# Patient Record
Sex: FEMALE | Race: WHITE | NOT HISPANIC OR LATINO | Employment: OTHER | URBAN - NONMETROPOLITAN AREA
[De-identification: names, ages, dates, MRNs, and addresses within clinical notes are randomized per-mention and may not be internally consistent; named-entity substitution may affect disease eponyms.]

---

## 2021-03-27 ENCOUNTER — HOSPITAL ENCOUNTER (EMERGENCY)
Facility: HOSPITAL | Age: 71
Discharge: HOME | End: 2021-03-27
Attending: EMERGENCY MEDICINE
Payer: COMMERCIAL

## 2021-03-27 ENCOUNTER — APPOINTMENT (EMERGENCY)
Dept: RADIOLOGY | Facility: HOSPITAL | Age: 71
End: 2021-03-27
Payer: COMMERCIAL

## 2021-03-27 ENCOUNTER — APPOINTMENT (EMERGENCY)
Dept: CT IMAGING | Facility: HOSPITAL | Age: 71
End: 2021-03-27
Payer: COMMERCIAL

## 2021-03-27 VITALS
HEART RATE: 83 BPM | TEMPERATURE: 97.4 F | RESPIRATION RATE: 18 BRPM | SYSTOLIC BLOOD PRESSURE: 178 MMHG | HEIGHT: 68 IN | DIASTOLIC BLOOD PRESSURE: 81 MMHG | OXYGEN SATURATION: 98 %

## 2021-03-27 DIAGNOSIS — M25.551 RIGHT HIP PAIN: Primary | ICD-10-CM

## 2021-03-27 LAB
ALBUMIN SERPL BCP-MCNC: 3.7 G/DL (ref 3.5–5)
ALP SERPL-CCNC: 87 U/L (ref 46–116)
ALT SERPL W P-5'-P-CCNC: 21 U/L (ref 12–78)
ANION GAP SERPL CALCULATED.3IONS-SCNC: 8 MMOL/L (ref 4–13)
AST SERPL W P-5'-P-CCNC: 14 U/L (ref 5–45)
BACTERIA UR QL AUTO: NORMAL /HPF
BASOPHILS # BLD AUTO: 0.05 THOUSANDS/ΜL (ref 0–0.1)
BASOPHILS NFR BLD AUTO: 1 % (ref 0–1)
BILIRUB SERPL-MCNC: 0.27 MG/DL (ref 0.2–1)
BILIRUB UR QL STRIP: NEGATIVE
BUN SERPL-MCNC: 14 MG/DL (ref 5–25)
CALCIUM SERPL-MCNC: 9 MG/DL (ref 8.3–10.1)
CHLORIDE SERPL-SCNC: 106 MMOL/L (ref 100–108)
CLARITY UR: CLEAR
CO2 SERPL-SCNC: 27 MMOL/L (ref 21–32)
COLOR UR: ABNORMAL
CREAT SERPL-MCNC: 1.08 MG/DL (ref 0.6–1.3)
EOSINOPHIL # BLD AUTO: 0.05 THOUSAND/ΜL (ref 0–0.61)
EOSINOPHIL NFR BLD AUTO: 1 % (ref 0–6)
ERYTHROCYTE [DISTWIDTH] IN BLOOD BY AUTOMATED COUNT: 11.7 % (ref 11.6–15.1)
GFR SERPL CREATININE-BSD FRML MDRD: 52 ML/MIN/1.73SQ M
GLUCOSE SERPL-MCNC: 112 MG/DL (ref 65–140)
GLUCOSE UR STRIP-MCNC: NEGATIVE MG/DL
HCT VFR BLD AUTO: 40.3 % (ref 34.8–46.1)
HGB BLD-MCNC: 13.1 G/DL (ref 11.5–15.4)
HGB UR QL STRIP.AUTO: ABNORMAL
IMM GRANULOCYTES # BLD AUTO: 0.03 THOUSAND/UL (ref 0–0.2)
IMM GRANULOCYTES NFR BLD AUTO: 0 % (ref 0–2)
KETONES UR STRIP-MCNC: NEGATIVE MG/DL
LEUKOCYTE ESTERASE UR QL STRIP: ABNORMAL
LIPASE SERPL-CCNC: 156 U/L (ref 73–393)
LYMPHOCYTES # BLD AUTO: 3.26 THOUSANDS/ΜL (ref 0.6–4.47)
LYMPHOCYTES NFR BLD AUTO: 33 % (ref 14–44)
MCH RBC QN AUTO: 30.1 PG (ref 26.8–34.3)
MCHC RBC AUTO-ENTMCNC: 32.5 G/DL (ref 31.4–37.4)
MCV RBC AUTO: 93 FL (ref 82–98)
MONOCYTES # BLD AUTO: 0.73 THOUSAND/ΜL (ref 0.17–1.22)
MONOCYTES NFR BLD AUTO: 8 % (ref 4–12)
NEUTROPHILS # BLD AUTO: 5.63 THOUSANDS/ΜL (ref 1.85–7.62)
NEUTS SEG NFR BLD AUTO: 57 % (ref 43–75)
NITRITE UR QL STRIP: NEGATIVE
NON-SQ EPI CELLS URNS QL MICRO: NORMAL /HPF
NRBC BLD AUTO-RTO: 0 /100 WBCS
PH UR STRIP.AUTO: 6 [PH]
PLATELET # BLD AUTO: 321 THOUSANDS/UL (ref 149–390)
PMV BLD AUTO: 10.2 FL (ref 8.9–12.7)
POTASSIUM SERPL-SCNC: 3.9 MMOL/L (ref 3.5–5.3)
PROT SERPL-MCNC: 7.6 G/DL (ref 6.4–8.2)
PROT UR STRIP-MCNC: NEGATIVE MG/DL
RBC # BLD AUTO: 4.35 MILLION/UL (ref 3.81–5.12)
RBC #/AREA URNS AUTO: NORMAL /HPF
SODIUM SERPL-SCNC: 141 MMOL/L (ref 136–145)
SP GR UR STRIP.AUTO: <=1.005 (ref 1–1.03)
UROBILINOGEN UR QL STRIP.AUTO: 0.2 E.U./DL
WBC # BLD AUTO: 9.75 THOUSAND/UL (ref 4.31–10.16)
WBC #/AREA URNS AUTO: NORMAL /HPF

## 2021-03-27 PROCEDURE — 96374 THER/PROPH/DIAG INJ IV PUSH: CPT

## 2021-03-27 PROCEDURE — 96375 TX/PRO/DX INJ NEW DRUG ADDON: CPT

## 2021-03-27 PROCEDURE — 85025 COMPLETE CBC W/AUTO DIFF WBC: CPT | Performed by: EMERGENCY MEDICINE

## 2021-03-27 PROCEDURE — 96361 HYDRATE IV INFUSION ADD-ON: CPT

## 2021-03-27 PROCEDURE — 81001 URINALYSIS AUTO W/SCOPE: CPT | Performed by: EMERGENCY MEDICINE

## 2021-03-27 PROCEDURE — 80053 COMPREHEN METABOLIC PANEL: CPT | Performed by: EMERGENCY MEDICINE

## 2021-03-27 PROCEDURE — 99285 EMERGENCY DEPT VISIT HI MDM: CPT | Performed by: EMERGENCY MEDICINE

## 2021-03-27 PROCEDURE — 99284 EMERGENCY DEPT VISIT MOD MDM: CPT

## 2021-03-27 PROCEDURE — 36415 COLL VENOUS BLD VENIPUNCTURE: CPT | Performed by: EMERGENCY MEDICINE

## 2021-03-27 PROCEDURE — 73502 X-RAY EXAM HIP UNI 2-3 VIEWS: CPT

## 2021-03-27 PROCEDURE — 83690 ASSAY OF LIPASE: CPT | Performed by: EMERGENCY MEDICINE

## 2021-03-27 PROCEDURE — 74176 CT ABD & PELVIS W/O CONTRAST: CPT

## 2021-03-27 PROCEDURE — G1004 CDSM NDSC: HCPCS

## 2021-03-27 RX ORDER — HYDROCODONE BITARTRATE AND ACETAMINOPHEN 5; 325 MG/1; MG/1
1 TABLET ORAL ONCE
Status: COMPLETED | OUTPATIENT
Start: 2021-03-27 | End: 2021-03-27

## 2021-03-27 RX ORDER — MECLIZINE HYDROCHLORIDE 25 MG/1
25 TABLET ORAL
COMMUNITY
Start: 2021-03-02

## 2021-03-27 RX ORDER — METOPROLOL SUCCINATE 25 MG/1
25 TABLET, EXTENDED RELEASE ORAL
COMMUNITY
Start: 2021-03-02

## 2021-03-27 RX ORDER — MONTELUKAST SODIUM 10 MG/1
10 TABLET ORAL
COMMUNITY
Start: 2021-03-02

## 2021-03-27 RX ORDER — RALOXIFENE HYDROCHLORIDE 60 MG/1
60 TABLET, FILM COATED ORAL
COMMUNITY
Start: 2021-03-02

## 2021-03-27 RX ORDER — HYDROCODONE BITARTRATE AND ACETAMINOPHEN 5; 325 MG/1; MG/1
1 TABLET ORAL EVERY 6 HOURS PRN
Qty: 10 TABLET | Refills: 0 | Status: SHIPPED | OUTPATIENT
Start: 2021-03-27 | End: 2021-04-06

## 2021-03-27 RX ORDER — MORPHINE SULFATE 4 MG/ML
4 INJECTION, SOLUTION INTRAMUSCULAR; INTRAVENOUS ONCE
Status: COMPLETED | OUTPATIENT
Start: 2021-03-27 | End: 2021-03-27

## 2021-03-27 RX ORDER — ONDANSETRON 2 MG/ML
4 INJECTION INTRAMUSCULAR; INTRAVENOUS ONCE
Status: COMPLETED | OUTPATIENT
Start: 2021-03-27 | End: 2021-03-27

## 2021-03-27 RX ORDER — NYSTATIN 100000 U/G
1 OINTMENT TOPICAL
COMMUNITY
Start: 2020-05-08 | End: 2021-05-08

## 2021-03-27 RX ORDER — FLUTICASONE PROPIONATE 50 MCG
1 SPRAY, SUSPENSION (ML) NASAL DAILY
COMMUNITY

## 2021-03-27 RX ADMIN — HYDROCODONE BITARTRATE AND ACETAMINOPHEN 1 TABLET: 5; 325 TABLET ORAL at 21:05

## 2021-03-27 RX ADMIN — MORPHINE SULFATE 4 MG: 4 INJECTION INTRAVENOUS at 18:52

## 2021-03-27 RX ADMIN — SODIUM CHLORIDE 1000 ML: 0.9 INJECTION, SOLUTION INTRAVENOUS at 18:50

## 2021-03-27 RX ADMIN — ONDANSETRON 4 MG: 2 INJECTION INTRAMUSCULAR; INTRAVENOUS at 18:52

## 2021-03-27 NOTE — ED PROVIDER NOTES
History  Chief Complaint   Patient presents with    Hip Pain     right hip pain of unknown origin     Patient is a 9year-old female presenting to emergency department complaining of crampy abdominal pain consistent with diverticulitis which she in the past she is now having pain from the right buttock shooting into the, worsened with certain movements and weight-bearing, denies any fall or, no nausea, vomiting or diarrhea, no dysuria or hematuria, no fever or chills          Prior to Admission Medications   Prescriptions Last Dose Informant Patient Reported? Taking? Calcium Carb-Cholecalciferol 600-800 MG-UNIT CHEW   Yes No   Sig: Chew 1 tablet   fluticasone (FLONASE) 50 mcg/act nasal spray   Yes Yes   Si spray into each nostril daily   meclizine (ANTIVERT) 25 mg tablet   Yes Yes   Sig: Take 25 mg by mouth   metoprolol succinate (TOPROL-XL) 25 mg 24 hr tablet   Yes Yes   Sig: Take 25 mg by mouth   montelukast (SINGULAIR) 10 mg tablet   Yes Yes   Sig: Take 10 mg by mouth   nystatin (MYCOSTATIN) ointment   Yes Yes   Sig: Apply 1 application topically   raloxifene (EVISTA) 60 mg tablet   Yes Yes   Sig: Take 60 mg by mouth      Facility-Administered Medications: None       Past Medical History:   Diagnosis Date    Arthritis     Hypertension     Renal disorder        History reviewed  No pertinent surgical history  History reviewed  No pertinent family history  I have reviewed and agree with the history as documented  E-Cigarette/Vaping    E-Cigarette Use Never User      E-Cigarette/Vaping Substances     Social History     Tobacco Use    Smoking status: Never Smoker    Smokeless tobacco: Never Used   Substance Use Topics    Alcohol use: Not Currently    Drug use: Not Currently       Review of Systems   Constitutional: Negative  HENT: Negative  Eyes: Negative  Respiratory: Negative  Cardiovascular: Negative  Gastrointestinal: Positive for abdominal pain  Endocrine: Negative  Genitourinary: Negative  Musculoskeletal: Positive for arthralgias (Right sacroiliac/hip)  Skin: Negative  Allergic/Immunologic: Negative  Neurological: Negative  Hematological: Negative  Psychiatric/Behavioral: Negative  Physical Exam  Physical Exam  Constitutional:       Appearance: She is well-developed  HENT:      Head: Normocephalic and atraumatic  Eyes:      Conjunctiva/sclera: Conjunctivae normal       Pupils: Pupils are equal, round, and reactive to light  Neck:      Musculoskeletal: Normal range of motion and neck supple  Cardiovascular:      Rate and Rhythm: Normal rate  Pulmonary:      Effort: Pulmonary effort is normal    Abdominal:      Palpations: Abdomen is soft  Tenderness: There is abdominal tenderness in the suprapubic area  Musculoskeletal: Normal range of motion  Back:       Comments: Tenderness to palpate over left PSIS, pain range of motion testing of the right   Skin:     General: Skin is warm and dry  Neurological:      Mental Status: She is alert and oriented to person, place, and time           Vital Signs  ED Triage Vitals   Temperature Pulse Respirations Blood Pressure SpO2   03/27/21 1822 03/27/21 1822 03/27/21 1822 03/27/21 1837 03/27/21 1822   98 5 °F (36 9 °C) 94 18 (!) 227/91 99 %      Temp Source Heart Rate Source Patient Position - Orthostatic VS BP Location FiO2 (%)   03/27/21 1822 03/27/21 1822 03/27/21 1822 03/27/21 1822 --   Oral Monitor Sitting Left arm       Pain Score       03/27/21 1822       6           Vitals:    03/27/21 1822 03/27/21 1837 03/27/21 2037   BP:  (!) 227/91 (!) 199/80   Pulse: 94  74   Patient Position - Orthostatic VS: Sitting  Sitting               ED Medications  Medications   HYDROcodone-acetaminophen (NORCO) 5-325 mg per tablet 1 tablet (has no administration in time range)   morphine (PF) 4 mg/mL injection 4 mg (4 mg Intravenous Given 3/27/21 1852)   sodium chloride 0 9 % bolus 1,000 mL (1,000 mL Intravenous New Bag 3/27/21 1850)   ondansetron (ZOFRAN) injection 4 mg (4 mg Intravenous Given 3/27/21 1852)       Diagnostic Studies  Results Reviewed     Procedure Component Value Units Date/Time    CMP [[id number]] Collected: 03/27/21 1850    Lab Status: Final result Specimen: Blood from Arm, Right Updated: 03/27/21 1911     Sodium 141 mmol/L      Potassium 3 9 mmol/L      Chloride 106 mmol/L      CO2 27 mmol/L      ANION GAP 8 mmol/L      BUN 14 mg/dL      Creatinine 1 08 mg/dL      Glucose 112 mg/dL      Calcium 9 0 mg/dL      AST 14 U/L      ALT 21 U/L      Alkaline Phosphatase 87 U/L      Total Protein 7 6 g/dL      Albumin 3 7 g/dL      Total Bilirubin 0 27 mg/dL      eGFR 52 ml/min/1 73sq m     Narrative:      National Kidney Disease Foundation guidelines for Chronic Kidney Disease (CKD):     Stage 1 with normal or high GFR (GFR > 90 mL/min/1 73 square meters)    Stage 2 Mild CKD (GFR = 60-89 mL/min/1 73 square meters)    Stage 3A Moderate CKD (GFR = 45-59 mL/min/1 73 square meters)    Stage 3B Moderate CKD (GFR = 30-44 mL/min/1 73 square meters)    Stage 4 Severe CKD (GFR = 15-29 mL/min/1 73 square meters)    Stage 5 End Stage CKD (GFR <15 mL/min/1 73 square meters)  Note: GFR calculation is accurate only with a steady state creatinine    Lipase [[id number]]  (Normal) Collected: 03/27/21 1850    Lab Status: Final result Specimen: Blood from Arm, Right Updated: 03/27/21 1911     Lipase 156 u/L     CBC and differential [[id number]] Collected: 03/27/21 1850    Lab Status: Final result Specimen: Blood from Arm, Right Updated: 03/27/21 1854     WBC 9 75 Thousand/uL      RBC 4 35 Million/uL      Hemoglobin 13 1 g/dL      Hematocrit 40 3 %      MCV 93 fL      MCH 30 1 pg      MCHC 32 5 g/dL      RDW 11 7 %      MPV 10 2 fL      Platelets 055 Thousands/uL      nRBC 0 /100 WBCs      Neutrophils Relative 57 %      Immat GRANS % 0 %      Lymphocytes Relative 33 %      Monocytes Relative 8 %      Eosinophils Relative 1 %      Basophils Relative 1 %      Neutrophils Absolute 5 63 Thousands/µL      Immature Grans Absolute 0 03 Thousand/uL      Lymphocytes Absolute 3 26 Thousands/µL      Monocytes Absolute 0 73 Thousand/µL      Eosinophils Absolute 0 05 Thousand/µL      Basophils Absolute 0 05 Thousands/µL     Urine Microscopic [[id number]]  (Normal) Collected: 03/27/21 1830    Lab Status: Final result Specimen: Urine, Clean Catch Updated: 03/27/21 1846     RBC, UA 0-1 /hpf      WBC, UA 2-4 /hpf      Epithelial Cells Occasional /hpf      Bacteria, UA None Seen /hpf     UA w Reflex to Microscopic w Reflex to Culture [[id number]]  (Abnormal) Collected: 03/27/21 1830    Lab Status: Final result Specimen: Urine, Clean Catch Updated: 03/27/21 1836     Color, UA Straw     Clarity, UA Clear     Specific Gravity, UA <=1 005     pH, UA 6 0     Leukocytes, UA Trace     Nitrite, UA Negative     Protein, UA Negative mg/dl      Glucose, UA Negative mg/dl      Ketones, UA Negative mg/dl      Urobilinogen, UA 0 2 E U /dl      Bilirubin, UA Negative     Blood, UA Trace-lysed                 CT renal stone study abdomen pelvis without contrast   Final Result by Parisa Trevino MD (03/27 2051)      No urinary tract calculi  Colonic diverticulosis without evidence of diverticulitis  Workstation performed: [id number]         XR hip/pelv 2-3 vws right   ED Interpretation by Junaid Silveira DO (03/27 1858)   No acute findings      Final Result by Parisa Trevino MD (03/27 2052)      No acute osseous abnormality              Workstation performed: [id number]                          ED Course  ED Course as of Mar 27 2058   Sat Mar 27, 2021   2057 Lab and imaging findings discussed at bedside which are unremarkable, findings consistent with musculoskeletal pain, patient given prescription for small amount of opiate pain medication, advised to follow-up with PCP and orthopedics as needed, return if symptoms worsen, patient acknowledges understanding and agreement with this plan                                                Disposition  Final diagnoses:   Right hip pain     Time reflects when diagnosis was documented in both MDM as applicable and the Disposition within this note     Time User Action Codes Description Comment    3/27/2021  8:56 PM Arlene Kumar [M25 551] Right hip pain       ED Disposition     ED Disposition Condition Date/Time Comment    Discharge Stable Sat Mar 27, 2021  8:56 PM Camilo Bowman discharge to home/self care  Follow-up Information     Follow up With Specialties Details Why [address] Walls IV, MD Family Medicine  As needed Austin Freeman [address]  [address] Orthopedic Surgery  As needed [address]  [address] [facility],[address] [facility]            Patient's Medications   Discharge Prescriptions    HYDROCODONE-ACETAMINOPHEN (NORCO) 5-325 MG PER TABLET    Take 1 tablet by mouth every 6 (six) hours as needed for pain for up to 10 daysMax Daily Amount: 4 tablets       Start Date: 3/27/2021 End Date: 4/6/2021       Order Dose: 1 tablet       Quantity: 10 tablet    Refills: 0     No discharge procedures on file      PDMP Review     None          ED Provider  Electronically Signed by           Johana Monroy DO  03/27/21 7999

## 2021-03-31 ENCOUNTER — OFFICE VISIT (OUTPATIENT)
Dept: OBGYN CLINIC | Facility: CLINIC | Age: 71
End: 2021-03-31
Payer: COMMERCIAL

## 2021-03-31 VITALS
DIASTOLIC BLOOD PRESSURE: 80 MMHG | WEIGHT: 146 LBS | HEART RATE: 71 BPM | SYSTOLIC BLOOD PRESSURE: 160 MMHG | HEIGHT: 68 IN | TEMPERATURE: 98.4 F | BODY MASS INDEX: 22.13 KG/M2

## 2021-03-31 DIAGNOSIS — M46.1 SACROILIITIS (HCC): ICD-10-CM

## 2021-03-31 DIAGNOSIS — M54.50 CHRONIC RIGHT-SIDED LOW BACK PAIN WITHOUT SCIATICA: ICD-10-CM

## 2021-03-31 DIAGNOSIS — M25.551 RIGHT HIP PAIN: Primary | ICD-10-CM

## 2021-03-31 DIAGNOSIS — G89.29 CHRONIC RIGHT-SIDED LOW BACK PAIN WITHOUT SCIATICA: ICD-10-CM

## 2021-03-31 PROCEDURE — 99203 OFFICE O/P NEW LOW 30 MIN: CPT | Performed by: ORTHOPAEDIC SURGERY

## 2021-03-31 NOTE — PROGRESS NOTES
ASSESSMENT/PLAN:     Diagnoses and all orders for this visit:    Right hip pain  -     Ambulatory referral to Physical Therapy; Future    Chronic right-sided low back pain without sciatica    Sacroiliitis ([address] )  -     Ambulatory referral to Physical Therapy; Future         x-rays performed in the ED reviewed with the patient  She was provided a referral to physical therapy  She should take tylenol as needed for pain control  We will transition care to Dr Aubrey Barrera for discussion of SI joint injections if she continues to experience pain  She was encouraged to follow up in 4 weeks time  She was encouraged to contact us should questions or concerns arise  Return in about 4 weeks (around 4/28/2021)  History of Present Illness: The patient is a 79 y o  female presenting today  For evaluation of left hip pain  The patient reports approximately 10 days ago she was playing with her grandchild dog when she began experiencing right groin pain  She was able to do her normal activities for the next 2 days bit began experiencing increasing right grade wearing pain and right lower back pain  She reports her pain got so severe she could hardly sit up or lie down without help of her   She also needed the support of a walker due to the groin pain  She presented to the ED for evaluation as she thought she may been experiencing diverticulitis CT scan of the abdomen and x-rays of the right hip were obtained and more negative for any acute issues  She was sent home but encouraged to follow up with Orthopedics  Since then, she has been experiencing hip and lower back pain that is worsened with ambulating and going from a seated to a lying down position  She reports she struggles the most at night when trying to get into bed  She also requires the assistance of her  to set up properly in the morning  She has been using the walker at for assistance    She denies numbness or tingling in her right lower extremity  She denies radiation of her pain  She does report a prior injury in the right lower back approximately 1 year ago when she fell causing a contusion on her lower back  Past Medical, Social and Family History:  Past Medical History:   Diagnosis Date    Arthritis     Hypertension     Renal disorder      Past Surgical History:   Procedure Laterality Date    APPENDECTOMY       Allergies   Allergen Reactions    Advil [Ibuprofen] Other (See Comments)     Pt has kidney issues    Cetirizine Other (See Comments)    Lactose - Food Allergy Other (See Comments)    Levofloxacin Dizziness and Myalgia     LEVA-DIANNE    Penicillins Hives    Tetracyclines & Related Other (See Comments)     Current Outpatient Medications on File Prior to Visit   Medication Sig Dispense Refill    Calcium Carb-Cholecalciferol 600-800 MG-UNIT CHEW Chew 1 tablet      fluticasone (FLONASE) 50 mcg/act nasal spray 1 spray into each nostril daily      meclizine (ANTIVERT) 25 mg tablet Take 25 mg by mouth      metoprolol succinate (TOPROL-XL) 25 mg 24 hr tablet Take 25 mg by mouth      montelukast (SINGULAIR) 10 mg tablet Take 10 mg by mouth      nystatin (MYCOSTATIN) ointment Apply 1 application topically      raloxifene (EVISTA) 60 mg tablet Take 60 mg by mouth      HYDROcodone-acetaminophen (NORCO) 5-325 mg per tablet Take 1 tablet by mouth every 6 (six) hours as needed for pain for up to 10 daysMax Daily Amount: 4 tablets (Patient not taking: Reported on 3/31/2021) 10 tablet 0     No current facility-administered medications on file prior to visit        Social History     Socioeconomic History    Marital status: /Civil Union     Spouse name: Not on file    Number of children: Not on file    Years of education: Not on file    Highest education level: Not on file   Occupational History    Not on file   Social Needs    Financial resource strain: Not on file    Food insecurity     Worry: Not on file Inability: Not on file    Transportation needs     Medical: Not on file     Non-medical: Not on file   Tobacco Use    Smoking status: Never Smoker    Smokeless tobacco: Never Used   Substance and Sexual Activity    Alcohol use: Not Currently    Drug use: Not Currently    Sexual activity: Not Currently   Lifestyle    Physical activity     Days per week: Not on file     Minutes per session: Not on file    Stress: Not on file   Relationships    Social connections     Talks on phone: Not on file     Gets together: Not on file     Attends Restoration service: Not on file     Active member of club or organization: Not on file     Attends meetings of clubs or organizations: Not on file     Relationship status: Not on file    Intimate partner violence     Fear of current or ex partner: Not on file     Emotionally abused: Not on file     Physically abused: Not on file     Forced sexual activity: Not on file   Other Topics Concern    Not on file   Social History Narrative    Not on file     Family History   Problem Relation Age of Onset    Osteoporosis Mother     No Known Problems Father        I have reviewed the past medical, surgical, social and family history, medications and allergies as documented in the EMR  Review of systems:  Constitutional: Negative for fatigue, fever or loss of apetite  HENT: Negative  Respiratory: Negative for shortness of breath, dyspnea  Cardiovascular: Negative for chest pain/tightness  Gastrointestinal: Negative for abdominal pain, N/V  Endocrine: Negative for cold/heat intolerance, unexplained weight loss/gain  Genitourinary: Negative for flank pain, dysuria, hematuria  Musculoskeletal:   Positive as stated in the HPI  Skin: Negative for rash  Neurological:   Negative for numbness and tingling  Psychiatric/Behavioral: Negative for agitation       Physical Exam:    Blood pressure 160/80, pulse 71, temperature 98 4 °F (36 9 °C), temperature source Temporal, height 5' 8" (1 727 m), weight 66 2 kg (146 lb)  General/Constitutional: NAD, well developed, well nourished  HENT: Normocephalic, atraumatic  CV: Intact distal pulses, regular rate  Resp: No respiratory distress or labored breathing  Lymphatic: No lymphadenopathy palpated  Neuro: Alert and Oriented x 3, no focal deficits  Psych: Normal mood, normal affect, normal judgement, normal behavior  Skin: Warm, dry, no rashes, no erythema        Hip Exam (focused):    right hip:     No dislocation/deformity  ROM: Full  Greater troch:non-tender   SI joint: tender  Jomar test: positive  For groin pain  Carlos's test:  negative  FADIR:  negative  Abduction: 5/5  Back:    No TTP over lumbar spinous processes, paraspinal musculature  Negative SLR/Sitting Root     No calf tenderness to palpation bilaterally    LE NV Exam: +2 DP/PT pulses bilaterally  Sensation intact to light touch L2-S1 bilaterally      IMAGING:  I have personally reviewed pertinent films in PACS  x-rays of the right hip performed in the ED on 03/27/2021 demonstrate no acute osseous abnormalities  No significant degenerative changes      PROCEDURES:   Procedures   none      Leo De Luna

## 2021-04-05 ENCOUNTER — EVALUATION (OUTPATIENT)
Dept: PHYSICAL THERAPY | Facility: CLINIC | Age: 71
End: 2021-04-05
Payer: COMMERCIAL

## 2021-04-05 DIAGNOSIS — M46.1 SACROILIITIS (HCC): ICD-10-CM

## 2021-04-05 DIAGNOSIS — M25.551 RIGHT HIP PAIN: ICD-10-CM

## 2021-04-05 PROCEDURE — 97110 THERAPEUTIC EXERCISES: CPT | Performed by: PHYSICAL THERAPIST

## 2021-04-05 PROCEDURE — 97161 PT EVAL LOW COMPLEX 20 MIN: CPT | Performed by: PHYSICAL THERAPIST

## 2021-04-05 NOTE — PROGRESS NOTES
PT Evaluation     Today's date: 2021  Patient name: Jennifer Cramer  :   MRN: [id number]  Referring provider: Jessica Ford  Dx:   Encounter Diagnosis     ICD-10-CM    1  Right hip pain  M25 551 Ambulatory referral to Physical Therapy   2  [address]roili[address] ([facility] [address] )  M46 1 Ambulatory referral to Physical Therapy                  Assessment  Assessment details: Jennifer Cramer is a 79 y o  female presenting to outpatient physical therapy on 2021 with referral from Dr Marisol Marrufo for R hip pain/sacroiliitis  Upon evaluation, Jaquan May demonstrates impaired strength, gait, and decreased flexibility in LE  The listed impairments and functional limitation are effecting Jaquan May ability to function at prior level  Jaquan May would benefit from physical therapy services at this time in order to address the above discussed impairments and functional limitation in order to allow for a return to premorbid status  Thank you for the referral        Impairments: abnormal gait, abnormal or restricted ROM, activity intolerance, impaired physical strength, lacks appropriate home exercise program and pain with function    Symptom irritability: moderateUnderstanding of Dx/Px/POC: excellent  Goals  STG  1) R hip ROM will improve to [facility] in 3 weeks  2) R hip strength will improve 1/2 grade in 3 weeks  3) Pt will decrease pain levels by 2-3 at its worst in 3 weeks  LTG  1) Patient is independent in HEP  2) Pt will be able to get in and out of bed without difficulty by DC  3) Ambulation will be improved to PLOF in 6 weeks  4) Pt will be able to ambulate without an assistive device by DC  5) Pt will abolish hip/ groin by DC         Plan  Patient would benefit from: skilled physical therapy  Planned modality interventions: cryotherapy and hydrotherapy  Planned therapy interventions: abdominal trunk stabilization, manual therapy, joint mobilization, massage, balance, body mechanics training, breathing training, patient education, neuromuscular re-education, strengthening, therapeutic activities, stretching, graded exercise, home exercise program, flexibility, functional ROM exercises and gait training  Frequency: 2x week  Duration in weeks: 4  Treatment plan discussed with: patient        Subjective Evaluation    History of Present Illness  Mechanism of injury: Pt notes that the weekend of March 21st she was watching her grand-dog  She notes that she was chasing it around the house and she felt something pull in her groin, but didn't think anything of it  She states that as the days went on she started to get more pain and spasms in her posterior hip  Currently, she notes that is still getting spasms on a daily basis, but they have decreased  She is able to sleep at night, but is having trouble moving in bed without pain  She has been using heat, which has been helping  She was using a walker in the beg , but now is only using a SPC out in the community  She is still having some difficulty getting in and out of a chair, but is getting better  She notes that after walking ~ 10 minutes she starts to get some discomfort  She has been able to do stairs, but is moving slower than she was previously  She states that she is getting some tingling into her R leg, but has not gotten any worse    Quality of life: excellent    Pain  Current pain rating: 3  At best pain rating: 3  At worst pain rating: 10  Location: posteroir R hip and into her groin  Quality: dull ache and knife-like  Relieving factors: heat  Aggravating factors: walking, stair climbing and sitting    Social Support  Stairs in house: yes   Lives in: multiple-level home  Lives with: spouse      Diagnostic Tests  X-ray: normal  Treatments  No previous or current treatments  Patient Goals  Patient goals for therapy: increased strength, independence with ADLs/IADLs and decreased pain  Patient goal: "I want to be able to get in and out of bed without pain "        Objective Palpation     Right   Tenderness of the gluteus medius  Tenderness     Right Hip   Tenderness in the PSIS and sacroiliac joint  No tenderness in the greater trochanter  Lumbar Screen  Lumbar range of motion within normal limits with the following exceptions:Pain and pulling in HS with lumbar flexion     Neurological Testing     Sensation     Hip   Left Hip   Intact: light touch    Right Hip   Intact: light touch    Reflexes   Left   Patellar (L4): normal (2+)  Achilles (S1): trace (1+)    Right   Patellar (L4): normal (2+)  Achilles (S1): trace (1+)    Active Range of Motion   Left Hip   Normal active range of motion    Right Hip   Flexion: WFL  Abduction: WFL  Adduction: WFL  External rotation (90/90): WFL and with pain  Internal rotation (90/90): WFL and with pain    Strength/Myotome Testing     Left Hip   Planes of Motion   Flexion: 4  Abduction: 4  Adduction: 4  External rotation: 4    Right Hip   Planes of Motion   Flexion: 4-  Abduction: 4-  Adduction: 4-  External rotation: 4-    Tests     Right Hip   Positive SANDOVAL and SI compression  Additional Tests Details  Tenderness at PSIS on R     Poor flexibility in BL HS musculature 75% limited      Flowsheet Rows      Most Recent Value   PT/OT G-Codes   Current Score  26   Projected Score  59          Precautions: HTN, osteoporosis      Daily Treatment Diary:      Initial Evaluation Date: 04/05/21  Compliance 4/5                     Visit Number 1                    Re-Eval  IE                 MC   Foto Captured Y                           4/5                     Manual                      STM- gluts/LB                                                                  Ther-Ex                      Nu step- warm up                      SKC HEP                     HS stretch HEP                     clamshell HEP                     Hip add    HEP                     Hip fall out HEP                     PPT                       Mini squats bridge                                            Neuro Re-Ed                      PPT with TA                                                                          Ther-Act              transfer training- log roll 5'                                                Modalities                      Mh prn

## 2021-04-08 ENCOUNTER — OFFICE VISIT (OUTPATIENT)
Dept: PHYSICAL THERAPY | Facility: CLINIC | Age: 71
End: 2021-04-08
Payer: COMMERCIAL

## 2021-04-08 DIAGNOSIS — M46.1 SACROILIITIS (HCC): ICD-10-CM

## 2021-04-08 DIAGNOSIS — M25.551 RIGHT HIP PAIN: Primary | ICD-10-CM

## 2021-04-08 PROCEDURE — 97140 MANUAL THERAPY 1/> REGIONS: CPT | Performed by: PHYSICAL THERAPIST

## 2021-04-08 PROCEDURE — 97110 THERAPEUTIC EXERCISES: CPT | Performed by: PHYSICAL THERAPIST

## 2021-04-08 NOTE — PROGRESS NOTES
Daily Note     Today's date: 2021  Patient name: Kaur Garcia  : [id number]  MRN: [id number]  Referring provider: Jacob Harper  Dx:   Encounter Diagnosis     ICD-10-CM    1  Right hip pain  M25 551    2  Sacroiliitis ([facility] [address] )  M46 1                   Subjective: Pt notes improvement in her symptoms since last visit  She states that the spasms are less frequent and she is able to walk down the stairs with less difficulty the last few days  Objective: See treatment diary below      Assessment: Tolerated treatment well  Pt needed moderate cueing for proper form and intensity throughout session  She was able to perform new standing exercises introduced today without increased pain  She was unable to lift hips with bridge exercise, but was able to initiate movement  Patient demonstrated fatigue post treatment and would benefit from continued PT to address current limitations  Plan: Continue per plan of care  Precautions: HTN, osteoporosis      Daily Treatment Diary:      Initial Evaluation Date: 21  Compliance                    Visit Number 1 2                   Re-Eval  IE -                MC   Foto Captured Y -                                             Manual                      STM- gluts/LB   arb- piriformis                                                               Ther-Ex                      Nu step- warm up   8' L3                   SKC HEP  10x10"                   HS stretch HEP  3x30" ea                   clamshell HEP 2x10 red                   Hip add    HEP  5" 2x10                   Hip fall out HEP  10x 5" ea                   PPT    10x                   Mini squats   10x                   bridge   10x                   Hip abduction  10x ea           Standing HR/TR   20x                   Neuro Re-Ed                      PPT with TA   -                                                                       Ther-Act              transfer training- log roll 5'                                                Modalities                      Mh prn  10' post hip

## 2021-04-13 ENCOUNTER — OFFICE VISIT (OUTPATIENT)
Dept: PHYSICAL THERAPY | Facility: CLINIC | Age: 71
End: 2021-04-13
Payer: COMMERCIAL

## 2021-04-13 DIAGNOSIS — M25.551 RIGHT HIP PAIN: Primary | ICD-10-CM

## 2021-04-13 DIAGNOSIS — M46.1 SACROILIITIS (HCC): ICD-10-CM

## 2021-04-13 PROCEDURE — 97110 THERAPEUTIC EXERCISES: CPT | Performed by: PHYSICAL THERAPIST

## 2021-04-13 NOTE — PROGRESS NOTES
Daily Note     Today's date: 2021  Patient name: Gina Weldon  : [id number]  MRN: [id number]  Referring provider: Balwinder Ortez  Dx:   Encounter Diagnosis     ICD-10-CM    1  Right hip pain  M25 551    2  Sacroiliitis (HCC)  M46 1                   Subjective: Pt reports that she continues to make positive progress, has been compliant with HEP which is helping  Objective: See treatment diary below      Assessment: Tolerated treatment well  Pt was able to perform all exercises with less difficulty today  She noted some discomfort with bridge exercise, but was able to perform increased reps  Def  Manual work today to see how she does without it  Pt progressing well, continue to address strength deficits in future sessions  Patient exhibited good technique with therapeutic exercises and would benefit from continued PT to address current limitations  Plan: Continue per plan of care  Precautions: HTN, osteoporosis      Daily Treatment Diary:      Initial Evaluation Date: 21  Compliance                  Visit Number 1 2  3                 Re-Eval  IE -  -              [address] [place] [address] Y - -                                         Manual                      STM- gluts/LB   arb- piriformis  -                                                             Ther-Ex                      Nu step- warm up   8' L3  10' L3                 LTR   10x10"          SKC HEP  10x10"  10x10"                  HS stretch HEP  3x30" ea  3x30" ea                 clamshell HEP 2x10 red  green 2x10                  Hip add    HEP  5" 2x10  5" 2x10                 Hip fall out HEP  10x 5" ea  10x10" ea                 PPT    10x  15x                  Mini squats   10x  15x                  bridge   10x  15x                  Hip abduction  10x ea 2x10 3"          Standing HR/TR   20x  20x                  Neuro Re-Ed                      PPT with TA   - Ther-Act              transfer training- log roll 5'                                                Modalities                      Mh prn  10' post hip 10' post hip

## 2021-04-19 ENCOUNTER — OFFICE VISIT (OUTPATIENT)
Dept: PHYSICAL THERAPY | Facility: CLINIC | Age: 71
End: 2021-04-19
Payer: COMMERCIAL

## 2021-04-19 DIAGNOSIS — M25.551 RIGHT HIP PAIN: Primary | ICD-10-CM

## 2021-04-19 DIAGNOSIS — M46.1 SACROILIITIS (HCC): ICD-10-CM

## 2021-04-19 PROCEDURE — 97110 THERAPEUTIC EXERCISES: CPT | Performed by: PHYSICAL THERAPIST

## 2021-04-19 NOTE — PROGRESS NOTES
Daily Note     Today's date: 2021  Patient name: Alberto Arguello  :   MRN: [id number]  Referring provider: Renny Erickson  Dx:   Encounter Diagnosis     ICD-10-CM    1  Right hip pain  M25 551    2  Sacroiliitis ([address] )  M46 1                   Subjective: Pt notes that she is able to move around with less pain and difficulty in bed  She states that she is progressing well, just slow  Objective: See treatment diary below      Assessment: Tolerated treatment well and is progressing nicely  She was able to perform increased reps  Today without difficulty  Minimal tenderness in piriformis musculature, but has improved  Continue progressing as see fit  Patient exhibited good technique with therapeutic exercises and would benefit from continued PT to address current limitations  Plan: Continue per plan of care  Precautions: HTN, osteoporosis      Daily Treatment Diary:      Initial Evaluation Date: 21  Compliance                Visit Number 1 2  3  4               Re-Eval  IE -  -  -            [facility]   Foto Captured Y - -  -                                     Manual                      STM- gluts/LB   arb- piriformis  -  def  Ther-Ex                      Nu step- warm up   8' L3  10' L3  10' L3               LTR   10x10" 10x10"         SKC HEP  10x10"  10x10"   10x10"               HS stretch HEP  3x30" ea  3x30" ea  3x30" ea                clamshell HEP 2x10 red  green 2x10   green 2x10               Hip add    HEP  5" 2x10  5" 2x10  2x10 5"               Hip fall out HEP  10x 5" ea  10x10" ea  10x 10"               PPT    10x  15x   2x10                Mini squats   10x  15x   2x10                bridge   10x  15x   2x10               Hip abduction  10x ea 2x10 3" 2x10 3"         Standing HR/TR   20x  20x   20x               Neuro Re-Ed                      PPT with TA   - 10x5"                                                                   Ther-Act              transfer training- log roll 5'                                                Modalities                      Mh prn  10' post hip 10' post hip  10' post hip

## 2021-04-22 ENCOUNTER — OFFICE VISIT (OUTPATIENT)
Dept: PHYSICAL THERAPY | Facility: CLINIC | Age: 71
End: 2021-04-22
Payer: COMMERCIAL

## 2021-04-22 DIAGNOSIS — M46.1 SACROILIITIS (HCC): ICD-10-CM

## 2021-04-22 DIAGNOSIS — M25.551 RIGHT HIP PAIN: Primary | ICD-10-CM

## 2021-04-22 PROCEDURE — 97110 THERAPEUTIC EXERCISES: CPT | Performed by: PHYSICAL THERAPIST

## 2021-04-22 NOTE — PROGRESS NOTES
Daily Note     Today's date: 2021  Patient name: Froilan Van  :   MRN: [id number]  Referring provider: Farshad Zarco  Dx:   Encounter Diagnosis     ICD-10-CM    1  Right hip pain  M25 551    2  Sacroiliitis ([address] )  M46 1                   Subjective:Pt reports that she continues to feel good, is making good progress  Objective: See treatment diary below      Assessment: Tolerated treatment well  Progressed reps  Today, she was able to perform with little difficulty  Pt was able to transition from supine to sit with no pain or little difficulty today  Movement is still slower, but is improving  Pt reports compliance with HEP daily  Patient demonstrated fatigue post treatment and would benefit from continued PT to address current limitations  Plan: Continue per plan of care  Precautions: HTN, osteoporosis      Daily Treatment Diary:      Initial Evaluation Date: 21  Compliance              Visit Number 1 2  3  4  5             Re-Eval  IE -  -  -  -           [address]   Foto Captured Y - -  -  -                                 Manual                      STM- gluts/LB   arb- piriformis  -  def  -                                                         Ther-Ex                      Nu step- warm up   8' L3  10' L3  10' L3  10' L3             LTR   10x10" 10x10" -        SKC HEP  10x10"  10x10"   10x10" -             HS stretch HEP  3x30" ea  3x30" ea  3x30" ea   3x30" ea             clamshell HEP 2x10 red  green 2x10   green 2x10  green 15x SL             Hip add    HEP  5" 2x10  5" 2x10  2x10 5"  2x10 5"             Hip fall out HEP  10x 5" ea  10x10" ea  10x 10"  10x10" ea             PPT    10x  15x   2x10   2x10             Mini squats   10x  15x   2x10   25x             bridge   10x  15x   2x10  2x10             Hip abduction  10x ea 2x10 3" 2x10 3" 25x at bar        Standing HR/TR   20x  20x   20x  25x Neuro Re-Ed                      PPT with TA   -    10x5"  15x 5"                                                                 Ther-Act              transfer training- log roll 5'                                                Modalities                      Mh prn  10' post hip 10' post hip  10' post hip 10' post hip

## 2021-04-26 ENCOUNTER — OFFICE VISIT (OUTPATIENT)
Dept: PHYSICAL THERAPY | Facility: CLINIC | Age: 71
End: 2021-04-26
Payer: COMMERCIAL

## 2021-04-26 DIAGNOSIS — M25.551 RIGHT HIP PAIN: Primary | ICD-10-CM

## 2021-04-26 DIAGNOSIS — M46.1 SACROILIITIS (HCC): ICD-10-CM

## 2021-04-26 PROCEDURE — 97110 THERAPEUTIC EXERCISES: CPT | Performed by: PHYSICAL THERAPIST

## 2021-04-26 PROCEDURE — 97140 MANUAL THERAPY 1/> REGIONS: CPT | Performed by: PHYSICAL THERAPIST

## 2021-04-26 NOTE — PROGRESS NOTES
Daily Note     Today's date: 2021  Patient name: Kait Piersno  : [birth year]  MRN: [id number]  Referring provider: Mikel Reed  Dx:   Encounter Diagnosis     ICD-10-CM    1  Right hip pain  M25 551    2  Sacroiliitis (HCC)  M46 1                   Subjective: Pt reports feeling pretty good this morning  States that she continues to feel better  Objective: See treatment diary below      Assessment: Tolerated treatment well  Pt reported no increased pain with all exercise today  She needed less cueing for proper form throughout session  Pt progressing well, possible DC in the next few visits  Minimal tenderness in glut  musculature, but is improve from IE  Patient exhibited good technique with therapeutic exercises and would benefit from continued PT to address current limitations  Plan: Continue per plan of care  Precautions: HTN, osteoporosis      Daily Treatment Diary:      Initial Evaluation Date: 21  Compliance            Visit Number 1 2  3  4  5  6           Re-Eval  IE -  -  -  -  -        [facility]   Foto Captured Y - -  -  -  -                             Manual                      STM- gluts/LB   arb- piriformis  -  def  - arb                                                       Ther-Ex                      Nu step- warm up   8' L3  10' L3  10' L3  10' L3  L5 10'            LTR   10x10" 10x10" - -       SKC HEP  10x10"  10x10"   10x10" -  -           HS stretch HEP  3x30" ea  3x30" ea  3x30" ea   3x30" ea  3x30" ea           clamshell HEP 2x10 red  green 2x10   green 2x10  green 15x SL  green 15x SL           Hip add    HEP  5" 2x10  5" 2x10  2x10 5"  2x10 5"  3x10 5"           Hip fall out HEP  10x 5" ea  10x10" ea  10x 10"  10x10" ea  10x10" ea           PPT    10x  15x   2x10   2x10  -           Mini squats   10x  15x   2x10   25x  25x            bridge   10x  15x   2x10  2x10  2x10           Hip abduction  10x ea 2x10 3" 2x10 3" 25x at bar 25x at bar       Standing HR/TR   20x  20x   20x  25x  25x            Standing marches at bar      nv                                 Neuro Re-Ed                      PPT with TA   -    10x5"  15x 5"  2x10 5"                                                               Ther-Act              transfer training- log roll 5'                                                Modalities                      Mh prn  10' post hip 10' post hip  10' post hip 10' post hip 10' post hip

## 2021-04-28 ENCOUNTER — CONSULT (OUTPATIENT)
Dept: PAIN MEDICINE | Facility: CLINIC | Age: 71
End: 2021-04-28
Payer: COMMERCIAL

## 2021-04-28 VITALS
DIASTOLIC BLOOD PRESSURE: 68 MMHG | TEMPERATURE: 97.1 F | HEART RATE: 68 BPM | SYSTOLIC BLOOD PRESSURE: 126 MMHG | WEIGHT: 146 LBS | BODY MASS INDEX: 22.13 KG/M2 | RESPIRATION RATE: 20 BRPM | HEIGHT: 68 IN

## 2021-04-28 DIAGNOSIS — M53.3 SACROILIAC JOINT DYSFUNCTION OF RIGHT SIDE: Primary | ICD-10-CM

## 2021-04-28 PROCEDURE — 99204 OFFICE O/P NEW MOD 45 MIN: CPT | Performed by: ANESTHESIOLOGY

## 2021-04-28 RX ORDER — 0.9 % SODIUM CHLORIDE 0.9 %
10 VIAL (ML) INJECTION ONCE
Status: CANCELLED | OUTPATIENT
Start: 2021-04-28 | End: 2021-04-28

## 2021-04-28 RX ORDER — BUPIVACAINE HCL/PF 2.5 MG/ML
10 VIAL (ML) INJECTION ONCE
Status: CANCELLED | OUTPATIENT
Start: 2021-04-28 | End: 2021-04-28

## 2021-04-28 RX ORDER — METHYLPREDNISOLONE ACETATE 80 MG/ML
80 INJECTION, SUSPENSION INTRA-ARTICULAR; INTRALESIONAL; INTRAMUSCULAR; PARENTERAL; SOFT TISSUE ONCE
Status: CANCELLED | OUTPATIENT
Start: 2021-04-28 | End: 2021-04-28

## 2021-04-28 NOTE — PROGRESS NOTES
Assessment  1  Sacroiliac joint dysfunction of right side - Right  -     Case request operating room: BLOCK / INJECTION SACROILIAC; Standing  -     Case request operating room: BLOCK / INJECTION SACROILIAC    Right low axial low back pain around sacroiliac joint area that has been persistent for greater than 4 weeks  This pain has   Improved with physical therapy tremendous;   however her pain is still persistent to a moderate degree  Jomar's test, Gaenslen's test, tenderness over right sacroiliac joint with loading maneuvers positive  Right hip x-ray within normal limits  Reasonable at this time to proceed with interventional therapy given the fact that NSAIDs are contraindicated secondary to renal dysfunction  Plan  - right sacroiliac joint injection  - continue physical therapy for right sacroiliac joint  -RTC 2 weeks post procedure  There are risks associated with opioid medications, including dependence, addiction and tolerance  The patient understands and agrees to use these medications only as prescribed  Potential side effects of the medications include, but are not limited to, constipation, drowsiness, addiction, impaired judgment and risk of fatal overdose if not taken as prescribed  The patient was warned against driving while taking sedation medications  Sharing medications is a felony  At this point in time, the patient is showing no signs of addiction, abuse, diversion or suicidal ideation  [place] Prescription Drug Monitoring Program report was reviewed and was appropriate     Complete risks and benefits including bleeding, infection, tissue reaction, nerve injury and allergic reaction were discussed  The approach was demonstrated using models and literature was provided  Verbal and written consent was obtained  My impressions and treatment recommendations were discussed in detail with the patient who verbalized understanding and had no further questions    Discharge instructions were provided  I personally saw and examined the patient and I agree with the above discussed plan of care  No orders of the defined types were placed in this encounter  History of Present Illness    Mary Keller is a 79 y o  female  With past medical history of hypertension and CKD presenting with right low axial low back pain around sacroiliac joint area that has been persistent for greater than 4 weeks and described primarily as arthritic in nature  She describes 8/10 low back pain that is worse in the mornings and worse at the end of the day  The pain is characterized by achy, nagging, indolent, crampy, stabbing pain in her right axial low back  The patient describes that the pain is worse with standing for long periods of time on hard surfaces as well as with walking  The patient is a very active individual and feels as though this pain compromises her participation with independent activities of daily living  The pain can be debilitating at times and contribute to significant disability, compromising overall activity and independent activities of daily living  She has tried physical therapy with good relief of symptoms  Medications the patient has tried in the past include  Tylenol  She describes no radicular symptoms and has good strength  She denies any weakness numbness or paresthesias  The patient denies any bowel or bladder dysfunction as well  I have personally reviewed and/or updated the patient's past medical history, past surgical history, family history, social history, current medications, allergies, and vital signs today  Review of Systems   Constitutional: Positive for activity change  HENT: Negative  Eyes: Negative  Respiratory: Negative  Cardiovascular: Negative  Gastrointestinal: Negative  Endocrine: Negative  Genitourinary: Negative  Musculoskeletal: Positive for arthralgias, back pain, gait problem and myalgias     Skin: Negative  Allergic/Immunologic: Negative  Neurological: Negative for weakness and numbness  Hematological: Negative  Psychiatric/Behavioral: Negative  All other systems reviewed and are negative  Patient Active Problem List   Diagnosis    Sacroiliac joint dysfunction of right side - Right       Past Medical History:   Diagnosis Date    Arthritis     Hypertension     Renal disorder        Past Surgical History:   Procedure Laterality Date    APPENDECTOMY         Family History   Problem Relation Age of Onset    Osteoporosis Mother     No Known Problems Father        Social History     Occupational History    Not on file   Tobacco Use    Smoking status: Never Smoker    Smokeless tobacco: Never Used   Substance and Sexual Activity    Alcohol use: Not Currently    Drug use: Not Currently    Sexual activity: Not Currently       Current Outpatient Medications on File Prior to Visit   Medication Sig    Calcium Carb-Cholecalciferol 600-800 MG-UNIT CHEW Chew 1 tablet    fluticasone (FLONASE) 50 mcg/act nasal spray 1 spray into each nostril daily    meclizine (ANTIVERT) 25 mg tablet Take 25 mg by mouth    metoprolol succinate (TOPROL-XL) 25 mg 24 hr tablet Take 25 mg by mouth    montelukast (SINGULAIR) 10 mg tablet Take 10 mg by mouth    nystatin (MYCOSTATIN) ointment Apply 1 application topically    raloxifene (EVISTA) 60 mg tablet Take 60 mg by mouth     No current facility-administered medications on file prior to visit          Allergies   Allergen Reactions    Advil [Ibuprofen] Other (See Comments)     Pt has kidney issues    Cetirizine Other (See Comments)    Lactose - Food Allergy Other (See Comments)    Levofloxacin Dizziness and Myalgia     LEVA-DIANNE    Penicillins Hives    Tetracyclines & Related Other (See Comments)         Physical Exam    /68   Pulse 68   Temp (!) 97 1 °F (36 2 °C)   Resp 20   Ht 5' 8" (1 727 m)   Wt 66 2 kg (146 lb)   BMI 22 20 kg/m² Constitutional: normal, well developed, well nourished, alert, in no distress and non-toxic and no overt pain behavior  Eyes: anicteric  HEENT: grossly intact  Neck: supple, symmetric, trachea midline and no masses   Pulmonary:even and unlabored  Cardiovascular:No edema or pitting edema present  Skin:Normal without rashes or lesions and well hydrated  Psychiatric:Mood and affect appropriate  Neurologic:Cranial Nerves II-XII grossly intact Sensation grossly intact; no clonus negative haines's  Reflexes 2+ and brisk  SLR negative bilaterally  Musculoskeletal:normal gait  5/5 strength in all extremities with active range of motion movements bilaterally  Normal heel toe and tip toe walking  No pain with lumbar facet loading bilaterally and with lateral spine rotation  No ttp over lumbar paraspinal muscles  positive melissa's test,  positive gaenslen's  positive SIJ loading  Right-sided  Mild tenderness with internal rotation of hip with pain radiation to groin  External rotation normal, no tenderness to palpation over right GTB    Imaging    RIGHT HIP     INDICATION:   right hip pain      COMPARISON:  None     VIEWS:  XR HIP/PELV 2-3 VWS RIGHT W PELVIS IF PERFORMED         FINDINGS:     There is no acute fracture or dislocation      No significant hip degenerative changes      No lytic or blastic osseous lesion      Soft tissues are unremarkable      The visualized lumbar spine is unremarkable      IMPRESSION:     No acute osseous abnormality

## 2021-04-28 NOTE — PATIENT INSTRUCTIONS
Sacroiliitis   WHAT YOU NEED TO KNOW:   Sacroiliitis is a painful swelling of one or both of your sacroiliac joints that lasts at least 3 months  The sacroiliac joint connects your pelvis to the base of your spine  DISCHARGE INSTRUCTIONS:   Medicines:  Ask for more information about these and other medicines you may need to treat sacroiliitis:  · Pain medicine: You may be given medicine to take away or decrease pain  Do not wait until the pain is severe before you take your medicine  You may be given the medicine as a pill to swallow or as a lotion that you put on the painful area  · NSAIDs  help decrease swelling and pain or fever  This medicine is available with or without a doctor's order  NSAIDs can cause stomach bleeding or kidney problems in certain people  If you take blood thinner medicine, always ask your healthcare provider if NSAIDs are safe for you  Always read the medicine label and follow directions  · Muscle relaxers  help decrease pain and muscle spasms  · Take your medicine as directed  Contact your healthcare provider if you think your medicine is not helping or if you have side effects  Tell him of her if you are allergic to any medicine  Keep a list of the medicines, vitamins, and herbs you take  Include the amounts, and when and why you take them  Bring the list or the pill bottles to follow-up visits  Carry your medicine list with you in case of an emergency  Physical therapy:  Your healthcare provider may suggest physical therapy  Your physical therapist may teach you exercises to improve posture (the way you stand and sit), flexibility, and strength in your lower back  He may also teach you how to remain safely active and avoid further injury  Follow the exercise plan given to you by your physical therapist   Rest:  Follow your healthcare provider's instructions about how much rest you should get  Avoid activity that worsens your pain    Ice or heat packs:  Use ice or heat packs on the sore area of your body to decrease the pain and swelling  Put ice in a plastic bag covered with a towel on your low back  Cover heated items with a towel to avoid burns  Use ice and heat as directed  Follow up with your healthcare provider or spine specialist within 1 to 2 weeks:  Write down your questions so you remember to ask them during your visits  Contact your healthcare provider or spine specialist if:   · Your pain makes it hard for you to do your daily activities, such as work or school  · Your pain does not go away after treatment  · You feel depressed or anxious  · You have questions about your condition or care  Return to the emergency department if:   · You have a fever  · Your pain is worse than before  · Your pain prevents you from sleeping  © Copyright [address] Information is for End User's use only and may not be sold, redistributed or otherwise used for commercial purposes  All illustrations and images included in CareNotes® are the copyrighted property of A D A M [employer] Inc  or [employer]   The above information is an  only  It is not intended as medical advice for individual conditions or treatments  Talk to your doctor, nurse or pharmacist before following any medical regimen to see if it is safe and effective for you  Sacroiliac Joint Injection   WHAT YOU NEED TO KNOW:   What do I need to know about a sacroiliac joint injection? A sacroiliac (SI) joint injection is done to diagnose or treat pain from sacroiliac joint syndrome  The pain caused by this syndrome may be felt in your lower back, buttocks, groin, and your thigh  How do I prepare for an SI injection? Your healthcare provider will ask you to not take any pain medicine the day of the injection  Ask him if there are any other medicines you should not take  You will need to find someone to drive you home after your procedure  What will happen during the SI injection? You will be awake for your injection  You may be given calming medicine if you are anxious  · You will lie on your stomach with a pillow under your abdomen  Your healthcare provider will give you an injection of medicine to numb the area  He may use an x-ray, ultrasound, or CT scan to find the area to inject  You may also be given an injection of contrast material to help your SI joint show up better  Then, your healthcare provider will inject local anesthesia, antiinflammatory medicine, or both into your SI joint  · Healthcare providers will watch you closely for any problems for up to 30 minutes after your injection  Your healthcare provider will check to see if your pain decreases after the injection  What are the risks of an SI injection? You may have some weakness for a short time after your injection  The SI injection can cause bleeding, infection, and pain  It can also cause temporary weakness in your leg and problems urinating  You may have an allergic reaction to the medicine that is injected into your SI joint  Your pain may return and you may need more treatment  CARE AGREEMENT:   You have the right to help plan your care  Learn about your health condition and how it may be treated  Discuss treatment options with your healthcare providers to decide what care you want to receive  You always have the right to refuse treatment  The above information is an  only  It is not intended as medical advice for individual conditions or treatments  Talk to your doctor, nurse or pharmacist before following any medical regimen to see if it is safe and effective for you  © Copyright [address] [place] [address] Drive Information is for End User's use only and may not be sold, redistributed or otherwise used for commercial purposes   All illustrations and images included in CareNotes® are the copyrighted property of [employer]  or [address] [place] [address] [employer]

## 2021-04-29 ENCOUNTER — OFFICE VISIT (OUTPATIENT)
Dept: PHYSICAL THERAPY | Facility: CLINIC | Age: 71
End: 2021-04-29
Payer: COMMERCIAL

## 2021-04-29 DIAGNOSIS — M25.551 RIGHT HIP PAIN: Primary | ICD-10-CM

## 2021-04-29 DIAGNOSIS — M46.1 SACROILIITIS (HCC): ICD-10-CM

## 2021-04-29 PROCEDURE — 97110 THERAPEUTIC EXERCISES: CPT | Performed by: PHYSICAL THERAPIST

## 2021-04-29 NOTE — PROGRESS NOTES
Daily Note     Today's date: 2021  Patient name: Leighann Belcher  :   MRN: [id number]  Referring provider: Warden Molina  Dx:   Encounter Diagnosis     ICD-10-CM    1  Right hip pain  M25 551    2  Sacroiliitis (HCC)  M46 1                   Subjective: Pt notes that she had increased pain yesterday out of the blue, thinks it may have been how she slept  She notes that she did see pain management and they scheduled an injection for her for the beginning of May  Objective: See treatment diary below      Assessment: Tolerated treatment well  Pt able to transfer from supine<>sit with little difficulty anymore  Her strength is progressing nicely, continues to have some discomfort on R side greater than L, but has improved  Patient exhibited good technique with therapeutic exercises and would benefit from continued PT to address current deficits  Plan: Continue per plan of care  Precautions: HTN, osteoporosis      Daily Treatment Diary:      Initial Evaluation Date: 21  Compliance          Visit Number 1 2  3  4  5  6  7         Re-Eval  IE -  -  -  -  -  -      [facility]   Foto Captured Y - -  -  -  -  -                         Manual                      STM- gluts/LB   arb- piriformis  -  def  - arb  def                                                     Ther-Ex                      Nu step- warm up   8' L3  10' L3  10' L3  10' L3  L5 10'   L5 10'          LTR   10x10" 10x10" - -       SKC HEP  10x10"  10x10"   10x10" -  -           HS stretch HEP  3x30" ea  3x30" ea  3x30" ea   3x30" ea  3x30" ea  3x30" ea         clamshell HEP 2x10 red  green 2x10   green 2x10  green 15x SL  green 15x SL  blue 2x10         Hip add    HEP  5" 2x10  5" 2x10  2x10 5"  2x10 5"  3x10 5"  3x10 5"         Hip fall out HEP  10x 5" ea  10x10" ea  10x 10"  10x10" ea  10x10" ea  10x10" ea         PPT    10x  15x   2x10 2x10  -           Mini squats   10x  15x   2x10   25x  25x   25x          bridge   10x  15x   2x10  2x10  2x10  2x10         Hip abduction  10x ea 2x10 3" 2x10 3" 25x at bar 25x at bar 25x at bar      Standing HR/TR   20x  20x   20x  25x  25x   25x          Standing marches at bar      nv -      HS curls       15x ea at bar                   Neuro Re-Ed                      PPT with TA   -    10x5"  15x 5"  2x10 5"  2x10 5"                                                             Ther-Act              transfer training- log roll 5'                                                Modalities                      Mh prn  10' post hip 10' post hip  10' post hip 10' post hip 10' post hip  10' post hip

## 2021-05-03 ENCOUNTER — TELEPHONE (OUTPATIENT)
Dept: PAIN MEDICINE | Facility: CLINIC | Age: 71
End: 2021-05-03

## 2021-05-03 NOTE — TELEPHONE ENCOUNTER
Patient called to cancel epidural steroid injection  States she is feeling much better since starting PT  Will call as needed

## 2021-05-05 ENCOUNTER — OFFICE VISIT (OUTPATIENT)
Dept: PHYSICAL THERAPY | Facility: CLINIC | Age: 71
End: 2021-05-05
Payer: COMMERCIAL

## 2021-05-05 DIAGNOSIS — M25.551 RIGHT HIP PAIN: Primary | ICD-10-CM

## 2021-05-05 DIAGNOSIS — M46.1 SACROILIITIS (HCC): ICD-10-CM

## 2021-05-05 PROCEDURE — 97110 THERAPEUTIC EXERCISES: CPT | Performed by: PHYSICAL THERAPIST

## 2021-05-05 NOTE — PROGRESS NOTES
Daily Note     Today's date: 2021  Patient name: Trudy Gregory  :   MRN: [id number]  Referring provider: Cash Daniels  Dx:   Encounter Diagnosis     ICD-10-CM    1  Right hip pain  M25 551    2  Sacroiliitis ([facility] [address] )  M46 1                   Subjective: Pt reports that her pain levels have improved and she feels that she is doing more around the house  She did cancel her injection for this week because she thinks she is feeling better  Objective: See treatment diary below      Assessment: Tolerated treatment well  Pt needed less breaks today throughout session  She was able to perform PPT with march today, but needed moderate verbal cueing for proper form  Symptoms seem to be resolving, PT educated pt to continue ADL's and household chores that do not cause her more pain  Patient exhibited good technique with therapeutic exercises and would benefit from continued PT to address current deficits  Plan: Continue per plan of care  Precautions: HTN, osteoporosis      Daily Treatment Diary:      Initial Evaluation Date: 21  Compliance        Visit Number 1 2  3  4  5  6  7  8       Re-Eval  IE -  -  -  -  -  -  -    [facility]   Foto Captured Y - -  -  -  -  -  -                     Manual                      STM- gluts/LB   arb- piriformis  -  def  - arb  def  def                                                   Ther-Ex                      Nu step- warm up   8' L3  10' L3  10' L3  10' L3  L5 10'   L5 10'   L5 10'       LTR   10x10" 10x10" - -       SKC HEP  10x10"  10x10"   10x10" -  -           HS stretch HEP  3x30" ea  3x30" ea  3x30" ea   3x30" ea  3x30" ea  3x30" ea  3x30" ea       clamshell HEP 2x10 red  green 2x10   green 2x10  green 15x SL  green 15x SL  blue 2x10  blue 25x        Hip add    HEP  5" 2x10  5" 2x10  2x10 5"  2x10 5"  3x10 5"  3x10 5"  3x10 5"       Hip fall out HEP 10x 5" ea  10x10" ea  10x 10"  10x10" ea  10x10" ea  10x10" ea  10x10"       Mini squats   10x  15x   2x10   25x  25x   25x   25x        bridge   10x  15x   2x10  2x10  2x10  2x10  2x10       Hip abduction  10x ea 2x10 3" 2x10 3" 25x at bar 25x at bar 25x at bar 25x at bar     Standing HR/TR   20x  20x   20x  25x  25x   25x   30x        Standing marches at bar      nv -      HS curls       15x ea at bar 2x10 ea                  Neuro Re-Ed                      PPT with TA   -    10x5"  15x 5"  2x10 5"  2x10 5"  2x10 with march                                                            Ther-Act              transfer training- log roll 5'                                                Modalities                      Mh prn  10' post hip 10' post hip  10' post hip 10' post hip 10' post hip  10' post hip  10' post hip

## 2021-05-11 ENCOUNTER — OFFICE VISIT (OUTPATIENT)
Dept: PHYSICAL THERAPY | Facility: CLINIC | Age: 71
End: 2021-05-11
Payer: COMMERCIAL

## 2021-05-11 DIAGNOSIS — M25.551 RIGHT HIP PAIN: Primary | ICD-10-CM

## 2021-05-11 DIAGNOSIS — M46.1 SACROILIITIS (HCC): ICD-10-CM

## 2021-05-11 PROCEDURE — 97110 THERAPEUTIC EXERCISES: CPT | Performed by: PHYSICAL THERAPIST

## 2021-05-11 NOTE — PROGRESS NOTES
Re-Evaluation     Today's date: 2021  Patient name: Clara Skelton  :   MRN: [id number]  Referring provider: Lg Hernandez  Dx:   Encounter Diagnosis     ICD-10-CM    1  Right hip pain  M25 551    2  Sacroiliitis (HCC)  M46 1                      Assessment  Assessment details: Patient is a 79y o  year old female who attended physical therapy for 9 treatment sessions regarding R hip pain  Patient reports 75% improvement at this time which correlates to improved impairments and functionality  Patient has shown improvement throughout PT by demonstrating improved pain levels, ROM and strength  Patient continues to present with minimal pain decreased strength, and decreased tolerance to activity/endurance  Discussed continuing PT for 2 more weeks 2x a week to address current limitations, pt agreeable to plan  Bhupendra would benefit from continued physical therapy to address these issues and to maximize function  Thank you  Impairments: abnormal gait, abnormal or restricted ROM, activity intolerance, impaired physical strength, lacks appropriate home exercise program and pain with function    Symptom irritability: moderateUnderstanding of Dx/Px/POC: excellent  Goals  STG  1) R hip ROM will improve to [facility] in 3 weeks  -met  2) R hip strength will improve 1/2 grade in 3 weeks  -met  3) Pt will decrease pain levels by 2-3 at its worst in 3 weeks  -met    LTG  1) Patient is independent in HEP  -in progress  2) Pt will be able to get in and out of bed without difficulty by DC  -in progress, still takes increased time  3) Ambulation will be improved to PLOF in 6 weeks   -met  4) Pt will be able to ambulate without an assistive device by DC  -met  5) Pt will abolish hip/ groin by DC  -in progress- has minimal pain with increased activity       Plan  Patient would benefit from: skilled physical therapy  Planned modality interventions: cryotherapy and hydrotherapy  Planned therapy interventions: abdominal trunk stabilization, manual therapy, joint mobilization, massage, balance, body mechanics training, breathing training, patient education, neuromuscular re-education, strengthening, therapeutic activities, stretching, graded exercise, home exercise program, flexibility, functional ROM exercises and gait training  Frequency: 2x week  Duration in weeks: 2  Treatment plan discussed with: patient        Subjective Evaluation    History of Present Illness  Mechanism of injury:  Pt notes that she is progressing well and happy with how well she has been doing  Her pain levels are much better and she has been walking without assistive device for the last few weeks  She is able to do the stairs without difficulty and walking > 10 minutes with no increased pain  She notes that walking on uneven ground is a little hard, she has to be careful still  She has tried vacuuming and states that it is getting easier  Sleeping is much improved, she is able to move around a little better the last few weeks  Quality of life: excellent    Pain  Current pain rating: 3  At best pain rating: 3  At worst pain ratin  Location: posteroir R hip and into her groin  Quality: dull ache   Relieving factors: heat  Aggravating factors: walking, stair climbing and sitting    Social Support  Stairs in house: yes   Lives in: multiple-level home  Lives with: spouse      Diagnostic Tests  X-ray: normal  Treatments  No previous or current treatments  Patient Goals  Patient goals for therapy: increased strength, independence with ADLs/IADLs and decreased pain  Patient goal: "I want to be able to get in and out of bed without pain "        Objective     Palpation     Right   Tenderness of the gluteus medius  Tenderness     Right Hip   No tenderness in the PSIS and sacroiliac joint  No tenderness in the greater trochanter       Lumbar Screen  Lumbar range of motion within normal limits    Neurological Testing     Sensation     Hip Left Hip   Intact: light touch    Right Hip   Intact: light touch    Reflexes   Left   Patellar (L4): normal (2+)  Achilles (S1): trace (1+)    Right   Patellar (L4): normal (2+)  Achilles (S1): trace (1+)    Active Range of Motion   Left Hip   Normal active range of motion    Right Hip   Flexion: WFL  Abduction: WFL  Adduction: WFL  External [facility] (90/90): [facility]   [facility]90[facility]     Strength/Myotome Testing     Left Hip   Planes of Motion   Flexion: 4  Abduction: 4  Adduction: 4  External rotation: 4    Right Hip   Planes of Motion   Flexion: 4  Abduction: 4  Adduction: 4  External rotation: 4-    Tests     Right Hip   Negative SANDOVAL and SI compression  Additional Tests Details  Some tenderness at PSIS on R              Precautions: HTN, osteoporosis      Daily Treatment Diary:      Initial Evaluation Date: 04/05/21  Compliance 4/5 4/8 4/13 4/19 4/22 4/26 4/29 5/5 5/11     Visit Number 1 2  3  4  5  6  7  8 9     Re-Eval  IE -  -  -  -  -  -  -  Y  [facility] Y - -  -  -  -  -  -  -            4/5 4/8 4/13 4/19 4/22 4/26 4/29 5/5 5/11     Manual                      STM- gluts/LB   arb- piriformis  -  def  - arb  def  def  -                                                 Ther-Ex                      Nu step- warm up   8' L3  10' L3  10' L3  10' L3  L5 10'   L5 10'   L5 10'  L5 10'      LTR   10x10" 10x10" - -       SKC HEP  10x10"  10x10"   10x10" -  -           HS stretch HEP  3x30" ea  3x30" ea  3x30" ea   3x30" ea  3x30" ea  3x30" ea  3x30" ea  -     clamshell HEP 2x10 red  green 2x10   green 2x10  green 15x SL  green 15x SL  blue 2x10  blue 25x   blue 25x      Hip add    HEP  5" 2x10  5" 2x10  2x10 5"  2x10 5"  3x10 5"  3x10 5"  3x10 5"  3x10 5"     Hip fall out HEP  10x 5" ea  10x10" ea  10x 10"  10x10" ea  10x10" ea  10x10" ea  10x10"  10x10"     Mini squats   10x  15x   2x10   25x  25x   25x   25x   25x      bridge   10x  15x   2x10  2x10  2x10  2x10  2x10  25x Hip abduction  10x ea 2x10 3" 2x10 3" 25x at bar 25x at bar 25x at bar 25x at bar 25x ea at bar    Standing HR/TR   20x  20x   20x  25x  25x   25x   30x   30x     Standing marches at bar      nv -  2x10 ea    HS curls       15x ea at bar 2x10 ea -                 Neuro Re-Ed                      PPT with TA   -    10x5"  15x 5"  2x10 5"  2x10 5"  2x10 with march   -                                                         Ther-Act              transfer training- log roll 5'                                                Modalities                      Mh prn  10' post hip 10' post hip  10' post hip 10' post hip 10' post hip  10' post hip  10' post hip  10' post hip

## 2021-05-13 ENCOUNTER — OFFICE VISIT (OUTPATIENT)
Dept: PHYSICAL THERAPY | Facility: CLINIC | Age: 71
End: 2021-05-13
Payer: COMMERCIAL

## 2021-05-13 DIAGNOSIS — M25.551 RIGHT HIP PAIN: Primary | ICD-10-CM

## 2021-05-13 DIAGNOSIS — M46.1 SACROILIITIS (HCC): ICD-10-CM

## 2021-05-13 PROCEDURE — 97110 THERAPEUTIC EXERCISES: CPT | Performed by: PHYSICAL THERAPIST

## 2021-05-13 NOTE — PROGRESS NOTES
Daily Note     Today's date: 2021  Patient name: Leighann Belcher  :   MRN: [id number]  Referring provider: Warden Molina  Dx:   Encounter Diagnosis     ICD-10-CM    1  Right hip pain  M25 551    2  Sacroiliitis ([facility] [address] )  M46 1                   Subjective: Pt notes that she did a lot of errands yesterday and she felt good  States that her endurance and strength is improving each week  Objective: See treatment diary below      Assessment: Tolerated treatment well  Added weight to standing exercises today, she was able to perform with little difficulty  Pt progressing well towards her goals, no pain or discomfort noted in her hip during session today  Patient exhibited good technique with therapeutic exercises and would benefit from continued PT  Plan: Continue per plan of care  Precautions: HTN, osteoporosis      Daily Treatment Diary:      Initial Evaluation Date: 21  Compliance    Visit Number 1 2  3  4  5  6  7  8 9  10   Re-Eval  IE -  -  -  -  -  -  -  Y  -   Foto Captured Y - -  -  -  -  -  -  -  -             Manual                      STM- gluts/LB   arb- piriformis  -  def  - arb  def  def  -  -                                               Ther-Ex                      Nu step- warm up   8' L3  10' L3  10' L3  10' L3  L5 10'   L5 10'   L5 10'  L5 10'   L5 10'    LTR   10x10" 10x10" - -       SKC HEP  10x10"  10x10"   10x10" -  -           HS stretch HEP  3x30" ea  3x30" ea  3x30" ea   3x30" ea  3x30" ea  3x30" ea  3x30" ea  -  -   clamshell HEP 2x10 red  green 2x10   green 2x10  green 15x SL  green 15x SL  blue 2x10  blue 25x   blue 25x   blue 25x    Hip add    HEP  5" 2x10  5" 2x10  2x10 5"  2x10 5"  3x10 5"  3x10 5"  3x10 5"  3x10 5"  3x10 5"   Hip fall out HEP  10x 5" ea  10x10" ea  10x 10"  10x10" ea  10x10" ea  10x10" ea  10x10"  10x10"  10x10" Mini squats   10x  15x   2x10   25x  25x   25x   25x   25x   25x    bridge   10x  15x   2x10  2x10  2x10  2x10  2x10  25x   25x    Hip abduction  10x ea 2x10 3" 2x10 3" 25x at bar 25x at bar 25x at bar 25x at bar 25x ea at bar 20x  #1 ea   Standing HR/TR   20x  20x   20x  25x  25x   25x   30x   30x  30x   Standing marches at bar      nv -  2x10 ea 2x10 #1 ea   HS curls       15x ea at bar 2x10 ea - 2x10 #1                Neuro Re-Ed                      PPT with TA   -    10x5"  15x 5"  2x10 5"  2x10 5"  2x10 with march   -  -                                                       Ther-Act              transfer training- log roll 5'                                                Modalities                      Mh prn  10' post hip 10' post hip  10' post hip 10' post hip 10' post hip  10' post hip  10' post hip  10' post hip 10' post hip

## 2021-05-18 ENCOUNTER — OFFICE VISIT (OUTPATIENT)
Dept: PHYSICAL THERAPY | Facility: CLINIC | Age: 71
End: 2021-05-18
Payer: COMMERCIAL

## 2021-05-18 DIAGNOSIS — M25.551 RIGHT HIP PAIN: Primary | ICD-10-CM

## 2021-05-18 DIAGNOSIS — M46.1 SACROILIITIS (HCC): ICD-10-CM

## 2021-05-18 PROCEDURE — 97110 THERAPEUTIC EXERCISES: CPT | Performed by: PHYSICAL THERAPIST

## 2021-05-18 NOTE — PROGRESS NOTES
Daily Note     Today's date: 2021  Patient name: Danya Cary  : [birth year]  MRN: [id number]  Referring provider: Guido Rodriguez  Dx:   Encounter Diagnosis     ICD-10-CM    1  Right hip pain  M25 551    2  Sacroiliitis ([facility] [address] )  M46 1                   Subjective: Pt notes that she did a lot this weekend, states that she continues to feel good  Objective: See treatment diary below      Assessment: Tolerated treatment well  Pt tolerated increased weight with standing exercises with little difficulty  She notes that she can do more at home without having to rest in between so much  Pt is progressing well, possible DC next week  Patient exhibited good technique with therapeutic exercises and would benefit from continued PT to address strength deficits  Plan: Continue per plan of care  Precautions: HTN, osteoporosis      Daily Treatment Diary:      Initial Evaluation Date: 21  Compliance    Visit Number 11          10   Re-Eval  -          -   Foto Captured -          -             Manual              STM- gluts/LB -          -                               Ther-Ex              Nu step- warm up L5 10'          L5 10'    LTR 10x10"            SKC              HS stretch 3x30"          -   clamshell Blue 25x ea          blue 25x    Hip add    3x10 5"          3x10 5"   Hip fall out 10x10"          10x10"   Mini squats 25x          25x    bridge 25x          25x    Hip abduction 20x #1 5         20x  #1 ea   Standing HR/TR 30x          30x   Standing marches at bar 20x #1 5 ea         2x10 #1 ea   HS curls 2x10 ea #1 5         2x10 #1                Neuro Re-Ed              PPT with TA           -                                                       Ther-Act              transfer training- log roll                                         Modalities              Mh prn 10' post hip         10' post hip Yes - the patient is able to be screened

## 2021-05-20 ENCOUNTER — OFFICE VISIT (OUTPATIENT)
Dept: PHYSICAL THERAPY | Facility: CLINIC | Age: 71
End: 2021-05-20
Payer: COMMERCIAL

## 2021-05-20 DIAGNOSIS — M46.1 SACROILIITIS (HCC): ICD-10-CM

## 2021-05-20 DIAGNOSIS — M25.551 RIGHT HIP PAIN: Primary | ICD-10-CM

## 2021-05-20 PROCEDURE — 97110 THERAPEUTIC EXERCISES: CPT | Performed by: PHYSICAL THERAPIST

## 2021-05-20 NOTE — PROGRESS NOTES
Daily Note     Today's date: 2021  Patient name: Halle Jackson  : [id number]  MRN: [id number]  Referring provider: Jet Lee  Dx:   Encounter Diagnosis     ICD-10-CM    1  Right hip pain  M25 551    2  Sacroiliitis ([facility] [address] )  M46 1                   Subjective: Pt notes that she was able to sleep on her R and L side last night  She notes that she continues to feel like she is improving  Objective: See treatment diary below      Assessment: Tolerated treatment well  Pt was able to perform all exercise with increased weight without difficulty  She notes that exercises are feeling easier to her  Possible DC next week pending that she continues to feel good  Minimal cueing needed for proper form  Patient exhibited good technique with therapeutic exercises and would benefit from continued PT  Plan: Continue per plan of care  Precautions: HTN, osteoporosis      Daily Treatment Diary:      Initial Evaluation Date: 21  Compliance    Visit Number 11 12         10   Re-Eval  - -         -   Foto Captured - -         -             Manual              STM- gluts/LB - -         -                               Ther-Ex              Nu step- warm up L5 10' L5 10'          L5 10'    LTR 10x10" 10x10"           SKC              HS stretch 3x30" 3x30"         -   clamshell Blue 25x ea Blue 20x SL         blue 25x    Hip add    3x10 5" 3x10 5"         3x10 5"   Hip fall out 10x10" 10x10"         10x10"   Mini squats 25x 25x          25x    bridge 25x 25x          25x    Hip abduction 20x #1 5 20x #2        20x  #1 ea   Standing HR/TR 30x 30x          30x   Standing marches at bar 20x #1 5 ea 20x #2        2x10 #1 ea   HS curls 2x10 ea #1 5 2x10 #2        2x10 #1                Neuro Re-Ed              PPT with TA           -                                                       Ther-Act              transfer training- log roll Modalities              Mh prn 10' post hip 10' post hip         10' post hip

## 2021-05-25 ENCOUNTER — OFFICE VISIT (OUTPATIENT)
Dept: PHYSICAL THERAPY | Facility: CLINIC | Age: 71
End: 2021-05-25
Payer: COMMERCIAL

## 2021-05-25 DIAGNOSIS — M46.1 SACROILIITIS (HCC): ICD-10-CM

## 2021-05-25 DIAGNOSIS — M25.551 RIGHT HIP PAIN: Primary | ICD-10-CM

## 2021-05-25 PROCEDURE — 97110 THERAPEUTIC EXERCISES: CPT | Performed by: PHYSICAL THERAPIST

## 2021-05-25 NOTE — PROGRESS NOTES
Daily Note     Today's date: 2021  Patient name: Aure Bills  :   MRN: [id number]  Referring provider: Dayna Pierce  Dx:   Encounter Diagnosis     ICD-10-CM    1  Right hip pain  M25 551    2  Sacroiliitis ([address] )  M46 1                   Subjective: Pt notes that she continues to feel great, states that sleeping had improved a lot  Objective: See treatment diary below      Assessment: Tolerated treatment well  She noted less difficulty with standing exercises today  Noted no pain in hip during session, potential DC nv pending that she continues to feel good  Patient demonstrated fatigue post treatment, exhibited good technique with therapeutic exercises and would benefit from continued PT to address strength deficits  Plan: Continue per plan of care  Precautions: HTN, osteoporosis      Daily Treatment Diary:      Initial Evaluation Date: 21  Compliance    Visit Number 11 12 13        10   Re-Eval  - - -        -   Foto Captured - - -        -             Manual              STM- gluts/LB - -         -                               Ther-Ex              Nu step- warm up L5 10' L5 10'  Rec  Bike 10'         L5 10'    LTR 10x10" 10x10" 10x10"          SKC              HS stretch 3x30" 3x30" 3x30"        -   clamshell Blue 25x ea Blue 20x SL Blue SL 20x ea        blue 25x    Hip add    3x10 5" 3x10 5" 3x10 5"        3x10 5"   Hip fall out 10x10" 10x10" 10x10"        10x10"   Mini squats 25x 25x  25x         25x    bridge 25x 25x  25x        25x    Hip abduction 20x #1 5 20x #2 20x #2       20x  #1 ea   Standing HR/TR 30x 30x  30x        30x   Standing marches at bar 20x #1 5 ea 20x #2 20x #2       2x10 #1 ea   HS curls 2x10 ea #1 5 2x10 #2 2x10 #2       2x10 #1                Neuro Re-Ed              PPT with TA           -                                                       Ther-Act              transfer training- log roll                                         Modalities              Mh prn 10' post hip 10' post hip  10' post hip        10' post hip

## 2021-05-27 ENCOUNTER — OFFICE VISIT (OUTPATIENT)
Dept: PHYSICAL THERAPY | Facility: CLINIC | Age: 71
End: 2021-05-27
Payer: COMMERCIAL

## 2021-05-27 DIAGNOSIS — M46.1 SACROILIITIS (HCC): ICD-10-CM

## 2021-05-27 DIAGNOSIS — M25.551 RIGHT HIP PAIN: Primary | ICD-10-CM

## 2021-05-27 PROCEDURE — 97110 THERAPEUTIC EXERCISES: CPT | Performed by: PHYSICAL THERAPIST

## 2021-05-27 NOTE — PROGRESS NOTES
Daily Note/ DC Summary     Today's date: 2021  Patient name: Halle Jackson  : [id number]  MRN: [id number]  Referring provider: Jet Lee  Dx:   Encounter Diagnosis     ICD-10-CM    1  Right hip pain  M25 551    2  Sacroiliitis ([facility] [address] )  M46 1                   Subjective: Pt reports that she was able to mop her floors without difficulty yesterday  States that her endurance has improved and notices minimal to no discomfort in hip anymore  Is ready to DC from PT today  Objective: See treatment diary below      Assessment: Halle Jackson has been compliant with attending PT and HEP since initial eval  Halle Jackson has made improvements in objective data since IE and has maximized function  Reviewed HEP with her, no questions at this time  Patient is agreeable to d/c to HEP, and will contact clinic with any exercise related questions or concerns as needed  Goals  STG  1) [facility]  -met  2) R hip strength will improve 1/2 grade in 3 weeks  -met  3) Pt will decrease pain levels by 2-3 at its worst in 3 weeks  -met    LTG  1) Patient is independent in HEP  -met  2) Pt will be able to get in and out of bed without difficulty by DC  -met  3) Ambulation will be improved to PLOF in 6 weeks  -met  4) Pt will be able to ambulate without an assistive device by DC  -met  5) Pt will abolish hip/ groin by DC  -met      Plan: Pt will be DC from PT following today's session  Precautions: HTN, osteoporosis      Daily Treatment Diary:      Initial Evaluation Date: 21  Compliance    Visit Number 11 12 13 14       10   Re-Eval  - - - -       -   Foto Captured - - - Y       -             Manual              STM- gluts/LB - -         -                               Ther-Ex              Nu step- warm up L5 10' L5 10'  Rec   Bike 10'  L5 10'       L5 10'    LTR 10x10" 10x10" 10x10" 10x10"         [address] [place] [address] HS stretch 3x30" 3x30" 3x30" 3x30"       -   clamshell Blue 25x ea Blue 20x SL Blue SL 20x ea Blue SL 25x ea       blue 25x    Hip add    3x10 5" 3x10 5" 3x10 5" 3x10 5"       3x10 5"   Hip fall out 10x10" 10x10" 10x10" 10x10"       10x10"   Mini squats 25x 25x  25x  25x       25x    bridge 25x 25x  25x 25x       25x    Hip abduction 20x #1 5 20x #2 20x #2 20x #2 5      20x  #1 ea   Standing HR/TR 30x 30x  30x 30x       30x   Standing marches at bar 20x #1 5 ea 20x #2 20x #2 20x #2 5      2x10 #1 ea   HS curls 2x10 ea #1 5 2x10 #2 2x10 #2 2x10 #2 5      2x10 #1                Neuro Re-Ed              PPT with TA           -                                                       Ther-Act              transfer training- log roll                                         Modalities              Mh prn 10' post hip 10' post hip  10' post hip  10' post hip       10' post hip

## 2024-08-14 ENCOUNTER — OFFICE VISIT (OUTPATIENT)
Dept: URGENT CARE | Facility: CLINIC | Age: 74
End: 2024-08-14
Payer: COMMERCIAL

## 2024-08-14 VITALS
DIASTOLIC BLOOD PRESSURE: 84 MMHG | WEIGHT: 154 LBS | BODY MASS INDEX: 23.34 KG/M2 | TEMPERATURE: 97.4 F | OXYGEN SATURATION: 97 % | SYSTOLIC BLOOD PRESSURE: 196 MMHG | HEIGHT: 68 IN | HEART RATE: 92 BPM | RESPIRATION RATE: 16 BRPM
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
## 2024-08-14 DIAGNOSIS — R03.0 ELEVATED BLOOD PRESSURE READING: ICD-10-CM

## 2024-08-14 DIAGNOSIS — S80.862A INSECT BITE OF LEFT LOWER LEG, INITIAL ENCOUNTER: Primary | ICD-10-CM

## 2024-08-14 PROCEDURE — S9083 URGENT CARE CENTER GLOBAL: HCPCS | Performed by: PHYSICIAN ASSISTANT

## 2024-08-14 PROCEDURE — 99213 OFFICE O/P EST LOW 20 MIN: CPT | Performed by: PHYSICIAN ASSISTANT

## 2024-08-14 RX ORDER — LOSARTAN POTASSIUM 25 MG/1
25 TABLET ORAL DAILY
COMMUNITY
Start: 2024-06-11

## 2024-08-14 RX ORDER — DENOSUMAB 60 MG/ML
INJECTION SUBCUTANEOUS
COMMUNITY

## 2024-08-14 RX ORDER — CLOTRIMAZOLE AND BETAMETHASONE DIPROPIONATE 10; .64 MG/G; MG/G
1 CREAM TOPICAL 2 TIMES DAILY
COMMUNITY

## 2024-08-14 RX ORDER — SULFAMETHOXAZOLE/TRIMETHOPRIM 800-160 MG
1 TABLET ORAL EVERY 12 HOURS SCHEDULED
Qty: 14 TABLET | Refills: 0 | Status: SHIPPED | OUTPATIENT
Start: 2024-08-14 | End: 2024-08-21

## 2024-08-14 NOTE — PROGRESS NOTES
[facility]        NAME: Radha Luna is a 74 y.o. female  : 1950    MRN: [id number]  DATE: 2024  TIME: 2:38 PM    Assessment and Plan   Insect bite of left lower leg, initial encounter [S80.862A, W57.XXXA]  1. Insect bite of left lower leg, initial encounter  sulfamethoxazole-trimethoprim (BACTRIM DS) 800-160 mg per tablet      2. Elevated blood pressure reading              Patient Instructions     Patient Instructions   Take antibiotic as instructed.  Reviewed elevated blood pressure reading in office, encouraged taking blood pressure at home and discussed numbers that would warrant return or visit to ER.  Encourage follow-up with PCP.  With any progression or worsening of symptoms return to be seen in ER.      Follow up with PCP in 3-5 days.  Proceed to  ER if symptoms worsen.    Chief Complaint     Chief Complaint   Patient presents with    Insect Bite     Insect bite on left outer ankle 2 days ago. Area in red and swollen, not improving.          History of Present Illness       Patient is a 74-year-old female presenting today with insect bite x 3 days.  Patient notes 3 days ago she felt like she was stung by a possible wasp or bee, states she was outside doing some yard work, did not visualize an insect bite or sting her, no visible stingers or foreign bodies.  Notes that she quickly developed some redness and swelling around the area that has progressed over the last 2 days.  Has been applying some Benadryl cream to the area which has provided some relief of the itching.  Denies fever, discharge or drainage, numbness, tingling.        Review of Systems   Review of Systems   Constitutional:  Negative for chills and fever.   HENT:  Negative for ear pain and sore throat.    Eyes:  Negative for pain and visual disturbance.   Respiratory:  Negative for cough and shortness of breath.    Cardiovascular:  Negative for chest pain and palpitations.   Gastrointestinal:  Negative for  abdominal pain and vomiting.   Genitourinary:  Negative for dysuria and hematuria.   Musculoskeletal:  Negative for arthralgias and back pain.   Skin:  Positive for rash.   Neurological:  Negative for seizures and syncope.   All other systems reviewed and are negative.        Current Medications       Current Outpatient Medications:     Calcium Carb-Cholecalciferol 600-800 MG-UNIT CHEW, Chew 1 tablet, Disp: , Rfl:     clotrimazole-betamethasone (LOTRISONE) 1-0.05 % cream, Apply 1 Application topically 2 (two) times a day To affected area, Disp: , Rfl:     denosumab (Prolia) 60 mg/mL, Inject under the skin, Disp: , Rfl:     fluticasone (FLONASE) 50 mcg/act nasal spray, 1 spray into each nostril daily, Disp: , Rfl:     losartan (COZAAR) 25 mg tablet, Take 25 mg by mouth daily, Disp: , Rfl:     metoprolol succinate (TOPROL-XL) 25 mg 24 hr tablet, Take 25 mg by mouth, Disp: , Rfl:     montelukast (SINGULAIR) 10 mg tablet, Take 10 mg by mouth, Disp: , Rfl:     nystatin (MYCOSTATIN) ointment, Apply 1 application topically, Disp: , Rfl:     sulfamethoxazole-trimethoprim (BACTRIM DS) 800-160 mg per tablet, Take 1 tablet by mouth every 12 (twelve) hours for 7 days, Disp: 14 tablet, Rfl: 0    meclizine (ANTIVERT) 25 mg tablet, Take 25 mg by mouth (Patient not taking: Reported on 8/14/2024), Disp: , Rfl:     raloxifene (EVISTA) 60 mg tablet, Take 60 mg by mouth (Patient not taking: Reported on 8/14/2024), Disp: , Rfl:     Current Allergies     Allergies as of 08/14/2024 - Reviewed 08/14/2024   Allergen Reaction Noted    Advil [ibuprofen] Other (See Comments) 03/31/2021    Cetirizine Other (See Comments) 01/23/2013    Lactose - food allergy Other (See Comments) 06/13/2017    Levofloxacin Dizziness and Myalgia 04/16/2013    Penicillins Hives 01/23/2013    Tetracyclines & related Other (See Comments) and Hives 01/23/2013            The following portions of the patient's history were reviewed and updated as appropriate:  "allergies, current medications, past family history, past medical history, past social history, past surgical history and problem list.     Past Medical History:   Diagnosis Date    Arthritis     Hypertension     Renal disorder        Past Surgical History:   Procedure Laterality Date    APPENDECTOMY         Family History   Problem Relation Age of Onset    Osteoporosis Mother     No Known Problems Father          Medications have been verified.        Objective   BP (!) 196/84   Pulse 92   Temp (!) 97.4 °F (36.3 °C)   Resp 16   Ht 5' 8\" (1.727 m)   Wt 69.9 kg (154 lb)   SpO2 97%   BMI 23.42 kg/m²        Physical Exam     Physical Exam  Vitals reviewed.   Constitutional:       General: She is not in acute distress.  HENT:      Head: Normocephalic.   Cardiovascular:      Rate and Rhythm: Normal rate.      Pulses: Normal pulses.   Pulmonary:      Effort: Pulmonary effort is normal.   Musculoskeletal:        Feet:    Feet:      Comments: Small vesicular lesion around left lateral malleolus, surrounding erythema, no visible foreign body, no streaking, slight discomfort through range of motion of left foot and ankle.  Skin:     Capillary Refill: Capillary refill takes less than 2 seconds.      Findings: Rash present.   Neurological:      General: No focal deficit present.      Mental Status: She is alert and oriented to person, place, and time.                   "

## 2024-08-14 NOTE — PATIENT INSTRUCTIONS
Take antibiotic as instructed.  Reviewed elevated blood pressure reading in office, encouraged taking blood pressure at home and discussed numbers that would warrant return or visit to ER.  Encourage follow-up with PCP.  With any progression or worsening of symptoms return to be seen in ER.